# Patient Record
Sex: FEMALE | Race: BLACK OR AFRICAN AMERICAN | NOT HISPANIC OR LATINO | Employment: UNEMPLOYED | URBAN - METROPOLITAN AREA
[De-identification: names, ages, dates, MRNs, and addresses within clinical notes are randomized per-mention and may not be internally consistent; named-entity substitution may affect disease eponyms.]

---

## 2019-09-17 ENCOUNTER — RECORDS - HEALTHEAST (OUTPATIENT)
Dept: LAB | Facility: CLINIC | Age: 24
End: 2019-09-17

## 2019-09-18 LAB
GAMMA INTERFERON BACKGROUND BLD IA-ACNC: 0.04 IU/ML
M TB IFN-G BLD-IMP: NEGATIVE
MITOGEN IGNF BCKGRD COR BLD-ACNC: -0.01 IU/ML
MITOGEN IGNF BCKGRD COR BLD-ACNC: -0.01 IU/ML
QTF INTERPRETATION: NORMAL
QTF MITOGEN - NIL: >10 IU/ML

## 2022-03-15 ENCOUNTER — ANCILLARY PROCEDURE (OUTPATIENT)
Dept: ULTRASOUND IMAGING | Facility: CLINIC | Age: 27
End: 2022-03-15
Attending: EMERGENCY MEDICINE
Payer: COMMERCIAL

## 2022-03-15 ENCOUNTER — HOSPITAL ENCOUNTER (EMERGENCY)
Facility: CLINIC | Age: 27
Discharge: HOME | End: 2022-03-15
Attending: EMERGENCY MEDICINE | Admitting: EMERGENCY MEDICINE
Payer: COMMERCIAL

## 2022-03-15 VITALS
OXYGEN SATURATION: 98 % | RESPIRATION RATE: 16 BRPM | WEIGHT: 126 LBS | TEMPERATURE: 98.4 F | DIASTOLIC BLOOD PRESSURE: 75 MMHG | SYSTOLIC BLOOD PRESSURE: 114 MMHG | HEART RATE: 88 BPM

## 2022-03-15 DIAGNOSIS — L02.419 CELLULITIS AND ABSCESS OF LEG: ICD-10-CM

## 2022-03-15 DIAGNOSIS — L03.119 CELLULITIS AND ABSCESS OF LEG: ICD-10-CM

## 2022-03-15 PROCEDURE — 76882 US LMTD JT/FCL EVL NVASC XTR: CPT | Mod: LT

## 2022-03-15 PROCEDURE — 99284 EMERGENCY DEPT VISIT MOD MDM: CPT | Mod: 25

## 2022-03-16 NOTE — ED PROVIDER NOTES
EMERGENCY DEPARTMENT ENCOUNTER      NAME: Adelina Mcpherson  AGE: 26 year old female  YOB: 1995  MRN: [id number]  EVALUATION DATE & TIME: 3/15/2022  7:39 PM    PCP: Georgette Hastings    ED PROVIDER: Rudy James M.D.      Chief Complaint   Patient presents with     Cyst     groin         FINAL IMPRESSION:  Abscess left proximal medial thigh      ED COURSE & MEDICAL DECISION MAKING:    Pertinent Labs & Imaging studies reviewed. (See chart for details)  26 year old female presents to the Emergency Department for evaluation of current abscess in left proximal thigh.  Patient initially had this back in November.  It was drained at that time and she was placed on antibiotics.  She had a recurrence in February which spontaneously drained.  Patient reports sudden development of infection today after going to bed last night not being normal.  Examination reveals an area of erythema with multiple whitish foci consistent with developing abscess.  Bedside ultrasound immediately performed.  Approximately 2 x 1 cm x 1 cm area of fluid noted.  Patient very reluctant to proceed with drainage as she had difficulties previously.  Did discuss the possibility of antibiotics and heat packs.  However patient wished to proceed with incision and drainage.  Recommendations were to ice the area prior to injecting the area and proceeding with drainage.. Patient appears non toxic with stable vitals signs.     8:12 PM I met with the patient for the initial interview and physical examination. Discussed plan for treatment and workup in the ED.     9:12 PM Patient decided to leave because her anxiety is too severe.  Did offer antibiotics for patient declined.  She reports she will try to return later when her anxiety is improved    At the conclusion of the encounter I discussed the results of all of the tests and the disposition. The questions were answered and return precautions provided. The patient or family acknowledged  understanding and was agreeable with the care plan.       PPE: Provider wore gloves, N95 mask, eye protection, and surgical cap.     MEDICATIONS GIVEN IN THE EMERGENCY:  Medications - No data to display    NEW PRESCRIPTIONS STARTED AT TODAY'S ER VISIT  New Prescriptions    No medications on file          =================================================================    HPI    Patient information was obtained from: Patient    Use of Intrepreter: N/A       Hibaq SULTANA Mcpherson is a 26 year old female with a pertient medical history of recurring cyst who presents to the ED for evaluation of cyst.     Patient complains of a returning inflamed and painful cyst on her left groin area. Patient had the cyst drained on 11/24/21 and was given a course of antibiotics. Patient notes that the cyst was healed after her course of antibiotics. Patient says the second week of February she says the cyst returned and became pus filled. Patient says she took a shower and then the cyst drained on its own and she applied neosporin to it afterwards. Patient notes that there was no pain when the cyst returned in February. Patient reports that the cyst was not swollen last night but when she woke up today it was swollen and painful. Patient currently rates the pain an 8/10. Patient denies fever, chills, taking daily medications, or any other concerns at this time.       REVIEW OF SYSTEMS   Constitutional:  Denies fever, chills  Respiratory:  Denies productive cough or increased work of breathing  Cardiovascular:  Denies chest pain, palpitations  GI:  Denies abdominal pain, nausea, vomiting, or change in bowel or bladder habits   Musculoskeletal:  Denies any new muscle/joint swelling  Skin:  Denies rash. Endorses cyst in left groin area.  Neurologic:  Denies focal weakness  All systems negative except as marked.     PAST MEDICAL HISTORY:  History reviewed. No pertinent past medical history.    PAST SURGICAL HISTORY:  History reviewed. No  pertinent surgical history.      CURRENT MEDICATIONS:    No current facility-administered medications for this encounter.  No current outpatient medications on file.    ALLERGIES:  No Known Allergies    FAMILY HISTORY:  History reviewed. No pertinent family history.    SOCIAL HISTORY:   Social History     Socioeconomic History     Marital status: Single     Spouse name: None     Number of children: None     Years of education: None     Highest education level: None   Occupational History     None   Tobacco Use     Smoking status: None     Smokeless tobacco: None   Substance and Sexual Activity     Alcohol use: None     Drug use: None     Sexual activity: None   Other Topics Concern     None   Social History Narrative     None     Social Determinants of Health     Financial Resource Strain: Not on file   Food Insecurity: Not on file   Transportation Needs: Not on file   Physical Activity: Not on file   Stress: Not on file   Social Connections: Not on file   Intimate Partner Violence: Not on file   Housing Stability: Not on file       VITALS:  Patient Vitals for the past 24 hrs:   BP Temp Temp src Pulse Resp SpO2 Weight   03/15/22 1646 114/75 98.4  F (36.9  C) Oral 88 16 98 % 57.2 kg (126 lb)        PHYSICAL EXAM   Constitutional:  Awake, alert, in mild apparent distress  HENT:  Normocephalic, Atraumatic. Bilateral external ears normal. Oropharynx moist. Nose normal. Neck- Normal range of motion with no guarding, No midline cervical tenderness, Supple, No stridor.   Eyes:  PERRL, EOMI with no signs of entrapment, Conjunctiva normal, No discharge.   Musculoskeletal:  Intact distal pulses, No edema. Good range of motion in all major joints. No tenderness to palpation or major deformities noted.  Integument:  Warm, Dry.  1 x 3 cm area of erythema and tenderness with multiple whitish papules along the proximal medial thigh.  Neurologic:  Alert & oriented, Normal motor function, Normal sensory function, No focal deficits  noted.   Psychiatric:  Affect normal, Judgment normal, Mood normal.     Bedside ultrasound  Indications: Marked soft tissue swelling and discomfort along medial left thigh  Findings: Fluid-filled pocket approximately 3 x 1 x 1 cm        I, Francisco Mendoza, am serving as a scribe to document services personally performed by Rudy James MD, based on my observation and the provider's statements to me. I, Rudy James MD attest that Francisco Mendoza is acting in a scribe capacity, has observed my performance of the services and has documented them in accordance with my direction.    Rudy James M.D.  Emergency Medicine  [facility] EMERGENCY ROOM      Rudy James MD  03/15/22 6838

## 2022-03-16 NOTE — ED NOTES
Pt left because she was too anxious for the procedure. MD spoke with patient and attempted to give her an antibiotic but she wanted to leave. The pt didn't want to wait and said that she would be coming back. Pt left prior to discharge.

## 2022-11-23 ENCOUNTER — APPOINTMENT (OUTPATIENT)
Dept: CT IMAGING | Facility: CLINIC | Age: 27
End: 2022-11-23
Attending: EMERGENCY MEDICINE
Payer: COMMERCIAL

## 2022-11-23 ENCOUNTER — NURSE TRIAGE (OUTPATIENT)
Dept: NURSING | Facility: CLINIC | Age: 27
End: 2022-11-23

## 2022-11-23 ENCOUNTER — HOSPITAL ENCOUNTER (EMERGENCY)
Facility: CLINIC | Age: 27
Discharge: HOME | End: 2022-11-23
Attending: EMERGENCY MEDICINE | Admitting: EMERGENCY MEDICINE
Payer: COMMERCIAL

## 2022-11-23 VITALS
WEIGHT: 125 LBS | BODY MASS INDEX: 20.83 KG/M2 | HEART RATE: 85 BPM | RESPIRATION RATE: 14 BRPM | SYSTOLIC BLOOD PRESSURE: 118 MMHG | DIASTOLIC BLOOD PRESSURE: 63 MMHG | HEIGHT: 65 IN | TEMPERATURE: 98.3 F | OXYGEN SATURATION: 100 %

## 2022-11-23 DIAGNOSIS — D64.9 CHRONIC ANEMIA: ICD-10-CM

## 2022-11-23 DIAGNOSIS — E87.6 HYPOKALEMIA: ICD-10-CM

## 2022-11-23 DIAGNOSIS — K92.0 HEMATEMESIS WITH NAUSEA: ICD-10-CM

## 2022-11-23 DIAGNOSIS — R11.2 NAUSEA AND VOMITING, UNSPECIFIED VOMITING TYPE: ICD-10-CM

## 2022-11-23 LAB
ALBUMIN SERPL-MCNC: 4.4 G/DL (ref 3.5–5)
ALBUMIN UR-MCNC: 70 MG/DL
ALP SERPL-CCNC: 62 U/L (ref 45–120)
ALT SERPL W P-5'-P-CCNC: 9 U/L (ref 0–45)
AMPHETAMINES UR QL SCN: NORMAL
ANION GAP SERPL CALCULATED.3IONS-SCNC: 13 MMOL/L (ref 5–18)
APPEARANCE UR: CLEAR
AST SERPL W P-5'-P-CCNC: 24 U/L (ref 0–40)
BARBITURATES UR QL: NORMAL
BASOPHILS # BLD AUTO: 0 10E3/UL (ref 0–0.2)
BASOPHILS NFR BLD AUTO: 1 %
BENZODIAZ UR QL: NORMAL
BILIRUB DIRECT SERPL-MCNC: 0.2 MG/DL
BILIRUB SERPL-MCNC: 0.4 MG/DL (ref 0–1)
BILIRUB UR QL STRIP: NEGATIVE
BUN SERPL-MCNC: 6 MG/DL (ref 8–22)
CALCIUM SERPL-MCNC: 8.9 MG/DL (ref 8.5–10.5)
CANNABINOIDS UR QL SCN: NORMAL
CHLORIDE BLD-SCNC: 104 MMOL/L (ref 98–107)
CO2 SERPL-SCNC: 20 MMOL/L (ref 22–31)
COCAINE UR QL: NORMAL
COLOR UR AUTO: YELLOW
CREAT SERPL-MCNC: 0.72 MG/DL (ref 0.6–1.1)
CREAT UR-MCNC: 332 MG/DL
EOSINOPHIL # BLD AUTO: 0 10E3/UL (ref 0–0.7)
EOSINOPHIL NFR BLD AUTO: 0 %
ERYTHROCYTE [DISTWIDTH] IN BLOOD BY AUTOMATED COUNT: 21.6 % (ref 10–15)
FLUAV RNA SPEC QL NAA+PROBE: NEGATIVE
FLUBV RNA RESP QL NAA+PROBE: NEGATIVE
GFR SERPL CREATININE-BSD FRML MDRD: >90 ML/MIN/1.73M2
GLUCOSE BLD-MCNC: 133 MG/DL (ref 70–125)
GLUCOSE UR STRIP-MCNC: NEGATIVE MG/DL
HCG SERPL QL: NEGATIVE
HCT VFR BLD AUTO: 34.7 % (ref 35–47)
HGB BLD-MCNC: 9.8 G/DL (ref 11.7–15.7)
HGB UR QL STRIP: NEGATIVE
HOLD SPECIMEN: NORMAL
IMM GRANULOCYTES # BLD: 0 10E3/UL
IMM GRANULOCYTES NFR BLD: 0 %
KETONES UR STRIP-MCNC: >150 MG/DL
LEUKOCYTE ESTERASE UR QL STRIP: ABNORMAL
LIPASE SERPL-CCNC: 16 U/L (ref 0–52)
LYMPHOCYTES # BLD AUTO: 0.8 10E3/UL (ref 0.8–5.3)
LYMPHOCYTES NFR BLD AUTO: 20 %
MAGNESIUM SERPL-MCNC: 2 MG/DL (ref 1.8–2.6)
MCH RBC QN AUTO: 18.5 PG (ref 26.5–33)
MCHC RBC AUTO-ENTMCNC: 28.2 G/DL (ref 31.5–36.5)
MCV RBC AUTO: 65 FL (ref 78–100)
MONOCYTES # BLD AUTO: 0.2 10E3/UL (ref 0–1.3)
MONOCYTES NFR BLD AUTO: 4 %
MUCOUS THREADS #/AREA URNS LPF: PRESENT /LPF
NEUTROPHILS # BLD AUTO: 2.7 10E3/UL (ref 1.6–8.3)
NEUTROPHILS NFR BLD AUTO: 75 %
NITRATE UR QL: NEGATIVE
NRBC # BLD AUTO: 0 10E3/UL
NRBC BLD AUTO-RTO: 0 /100
OPIATES UR QL SCN: NORMAL
OXYCODONE UR QL: NORMAL
PCP UR QL SCN: NORMAL
PH UR STRIP: 6 [PH] (ref 5–7)
PLATELET # BLD AUTO: 378 10E3/UL (ref 150–450)
POTASSIUM BLD-SCNC: 3.1 MMOL/L (ref 3.5–5)
PROT SERPL-MCNC: 8.7 G/DL (ref 6–8)
RBC # BLD AUTO: 5.31 10E6/UL (ref 3.8–5.2)
RBC URINE: 2 /HPF
RSV RNA SPEC NAA+PROBE: NEGATIVE
SARS-COV-2 RNA RESP QL NAA+PROBE: NEGATIVE
SODIUM SERPL-SCNC: 137 MMOL/L (ref 136–145)
SP GR UR STRIP: 1.04 (ref 1–1.03)
SQUAMOUS EPITHELIAL: 2 /HPF
UROBILINOGEN UR STRIP-MCNC: 2 MG/DL
WBC # BLD AUTO: 3.7 10E3/UL (ref 4–11)
WBC URINE: 5 /HPF

## 2022-11-23 PROCEDURE — 80053 COMPREHEN METABOLIC PANEL: CPT | Performed by: EMERGENCY MEDICINE

## 2022-11-23 PROCEDURE — 80307 DRUG TEST PRSMV CHEM ANLYZR: CPT | Performed by: EMERGENCY MEDICINE

## 2022-11-23 PROCEDURE — 250N000011 HC RX IP 250 OP 636: Performed by: EMERGENCY MEDICINE

## 2022-11-23 PROCEDURE — 96365 THER/PROPH/DIAG IV INF INIT: CPT

## 2022-11-23 PROCEDURE — 85025 COMPLETE CBC W/AUTO DIFF WBC: CPT | Performed by: EMERGENCY MEDICINE

## 2022-11-23 PROCEDURE — 74177 CT ABD & PELVIS W/CONTRAST: CPT

## 2022-11-23 PROCEDURE — 82248 BILIRUBIN DIRECT: CPT | Performed by: EMERGENCY MEDICINE

## 2022-11-23 PROCEDURE — 81001 URINALYSIS AUTO W/SCOPE: CPT | Performed by: EMERGENCY MEDICINE

## 2022-11-23 PROCEDURE — 83735 ASSAY OF MAGNESIUM: CPT | Performed by: EMERGENCY MEDICINE

## 2022-11-23 PROCEDURE — 96375 TX/PRO/DX INJ NEW DRUG ADDON: CPT | Mod: 59

## 2022-11-23 PROCEDURE — 258N000003 HC RX IP 258 OP 636: Performed by: EMERGENCY MEDICINE

## 2022-11-23 PROCEDURE — 83690 ASSAY OF LIPASE: CPT | Performed by: EMERGENCY MEDICINE

## 2022-11-23 PROCEDURE — C9113 INJ PANTOPRAZOLE SODIUM, VIA: HCPCS | Performed by: EMERGENCY MEDICINE

## 2022-11-23 PROCEDURE — 36415 COLL VENOUS BLD VENIPUNCTURE: CPT | Performed by: EMERGENCY MEDICINE

## 2022-11-23 PROCEDURE — 87637 SARSCOV2&INF A&B&RSV AMP PRB: CPT | Performed by: EMERGENCY MEDICINE

## 2022-11-23 PROCEDURE — 84703 CHORIONIC GONADOTROPIN ASSAY: CPT | Performed by: EMERGENCY MEDICINE

## 2022-11-23 PROCEDURE — 96376 TX/PRO/DX INJ SAME DRUG ADON: CPT | Mod: 59

## 2022-11-23 PROCEDURE — 96366 THER/PROPH/DIAG IV INF ADDON: CPT

## 2022-11-23 PROCEDURE — 99285 EMERGENCY DEPT VISIT HI MDM: CPT | Mod: 25

## 2022-11-23 PROCEDURE — C9803 HOPD COVID-19 SPEC COLLECT: HCPCS

## 2022-11-23 RX ORDER — IOPAMIDOL 755 MG/ML
80 INJECTION, SOLUTION INTRAVASCULAR ONCE
Status: COMPLETED | OUTPATIENT
Start: 2022-11-23 | End: 2022-11-23

## 2022-11-23 RX ORDER — POTASSIUM CHLORIDE 7.45 MG/ML
10 INJECTION INTRAVENOUS ONCE
Status: COMPLETED | OUTPATIENT
Start: 2022-11-23 | End: 2022-11-23

## 2022-11-23 RX ORDER — ONDANSETRON 2 MG/ML
4 INJECTION INTRAMUSCULAR; INTRAVENOUS ONCE
Status: COMPLETED | OUTPATIENT
Start: 2022-11-23 | End: 2022-11-23

## 2022-11-23 RX ORDER — ONDANSETRON 4 MG/1
4 TABLET, ORALLY DISINTEGRATING ORAL EVERY 8 HOURS PRN
Qty: 10 TABLET | Refills: 0 | Status: SHIPPED | OUTPATIENT
Start: 2022-11-23

## 2022-11-23 RX ADMIN — SODIUM CHLORIDE 1000 ML: 9 INJECTION, SOLUTION INTRAVENOUS at 06:03

## 2022-11-23 RX ADMIN — PANTOPRAZOLE SODIUM 40 MG: 40 INJECTION, POWDER, FOR SOLUTION INTRAVENOUS at 04:12

## 2022-11-23 RX ADMIN — ONDANSETRON 4 MG: 2 INJECTION INTRAMUSCULAR; INTRAVENOUS at 07:04

## 2022-11-23 RX ADMIN — SODIUM CHLORIDE 1000 ML: 9 INJECTION, SOLUTION INTRAVENOUS at 04:07

## 2022-11-23 RX ADMIN — POTASSIUM CHLORIDE 10 MEQ: 7.46 INJECTION, SOLUTION INTRAVENOUS at 04:15

## 2022-11-23 RX ADMIN — ONDANSETRON 4 MG: 2 INJECTION INTRAMUSCULAR; INTRAVENOUS at 04:09

## 2022-11-23 RX ADMIN — IOPAMIDOL 80 ML: 755 INJECTION, SOLUTION INTRAVENOUS at 04:58

## 2022-11-23 ASSESSMENT — ENCOUNTER SYMPTOMS
FEVER: 1
ABDOMINAL PAIN: 1
COUGH: 0
SHORTNESS OF BREATH: 0
SEIZURES: 0
DYSURIA: 0
DIARRHEA: 0
VOMITING: 1
NAUSEA: 1

## 2022-11-23 ASSESSMENT — ACTIVITIES OF DAILY LIVING (ADL)
ADLS_ACUITY_SCORE: 35
ADLS_ACUITY_SCORE: 35

## 2022-11-23 NOTE — ED NOTES
"EMERGENCY DEPARTMENT SIGN OUT NOTE        ED COURSE AND MEDICAL DECISION MAKING  Patient was signed out to me by Dr Georgette Alvarado at 4:55 AM   6:00 AM I met with the patient and discussed plan for discharge.  Reevaluation reveals that her symptoms are improved in the emergency department.    In brief, Adelina Mcpherson is a 27 year old female who initially presented with vomiting     \"For nearly 24 hours now she has been having vomiting.  Multiple episodes and her last episode was now possibly containing blood.  She complains of upper abdominal and chest discomfort.  She also complains of subjective fevers.  Otherwise denies any cough, shortness of breath or diarrhea.  She has not had her flu shot this year.\"     At time of sign out, disposition was pending normal CT results and recheck     FINAL IMPRESSION    1. Nausea and vomiting, unspecified vomiting type    2. Hematemesis with nausea    3. Chronic anemia    4. Hypokalemia        ED MEDS  Medications   ondansetron (ZOFRAN) injection 4 mg (4 mg Intravenous Given 11/23/22 0409)   0.9% sodium chloride BOLUS (0 mLs Intravenous Stopped 11/23/22 0609)   pantoprazole (PROTONIX) IV push injection 40 mg (40 mg Intravenous Given 11/23/22 0412)   potassium chloride 10 mEq in 100 mL sterile water infusion (0 mEq Intravenous Stopped 11/23/22 0610)   0.9% sodium chloride BOLUS (1,000 mLs Intravenous New Bag 11/23/22 0603)   iopamidol (ISOVUE-370) solution 80 mL (80 mLs Intravenous Given 11/23/22 0458)       LAB  Labs Ordered and Resulted from Time of ED Arrival to Time of ED Departure   BASIC METABOLIC PANEL - Abnormal       Result Value    Sodium 137      Potassium 3.1 (*)     Chloride 104      Carbon Dioxide (CO2) 20 (*)     Anion Gap 13      Urea Nitrogen 6 (*)     Creatinine 0.72      Calcium 8.9      Glucose 133 (*)     GFR Estimate >90     HEPATIC FUNCTION PANEL - Abnormal    Bilirubin Total 0.4      Bilirubin Direct 0.2      Protein Total 8.7 (*)     Albumin 4.4      " Alkaline Phosphatase 62      AST 24      ALT 9     ROUTINE UA WITH MICROSCOPIC REFLEX TO CULTURE - Abnormal    Color Urine Yellow      Appearance Urine Clear      Glucose Urine Negative      Bilirubin Urine Negative      Ketones Urine >150 (*)     Specific Gravity Urine 1.042 (*)     Blood Urine Negative      pH Urine 6.0      Protein Albumin Urine 70 (*)     Urobilinogen Urine 2.0 (*)     Nitrite Urine Negative      Leukocyte Esterase Urine 25 Viola/uL (*)     Mucus Urine Present (*)     RBC Urine 2      WBC Urine 5      Squamous Epithelials Urine 2 (*)    CBC WITH PLATELETS AND DIFFERENTIAL - Abnormal    WBC Count 3.7 (*)     RBC Count 5.31 (*)     Hemoglobin 9.8 (*)     Hematocrit 34.7 (*)     MCV 65 (*)     MCH 18.5 (*)     MCHC 28.2 (*)     RDW 21.6 (*)     Platelet Count 378      % Neutrophils 75      % Lymphocytes 20      % Monocytes 4      % Eosinophils 0      % Basophils 1      % Immature Granulocytes 0      NRBCs per 100 WBC 0      Absolute Neutrophils 2.7      Absolute Lymphocytes 0.8      Absolute Monocytes 0.2      Absolute Eosinophils 0.0      Absolute Basophils 0.0      Absolute Immature Granulocytes 0.0      Absolute NRBCs 0.0     LIPASE - Normal    Lipase 16     HCG QUALITATIVE PREGNANCY - Normal    hCG Serum Qualitative Negative     INFLUENZA A/B & SARS-COV2 PCR MULTIPLEX - Normal    Influenza A PCR Negative      Influenza B PCR Negative      RSV PCR Negative      SARS CoV2 PCR Negative     MAGNESIUM - Normal    Magnesium 2.0     DRUGS OF ABUSE 1 PANEL, URINE ([facility] ONLY)    Amphetamines Urine Screen Negative      Benzodiazepines Urine Screen Negative      Opiates Urine Screen Negative      PCP Urine Screen Negative      Cannabinoids Urine Screen Negative      Barbiturates Urine Screen Negative      Cocaine Urine Screen Negative      Oxycodone Urine Screen Negative      Creatinine Urine mg/dL 332           RADIOLOGY    CT Chest/Abdomen/Pelvis w Contrast   Final Result   IMPRESSION:   1.  Negative  CT of the chest, abdomen, and pelvis. No acute abnormalities or CT findings to explain the patient's symptoms.             DISCHARGE MEDS  New Prescriptions    OMEPRAZOLE (PRILOSEC) 20 MG DR CAPSULE    Take 1 capsule (20 mg) by mouth daily for 14 days    ONDANSETRON (ZOFRAN ODT) 4 MG ODT TAB    Take 1 tablet (4 mg) by mouth every 8 hours as needed for nausea or vomiting       Li Elkins MD  [facility] EMERGENCY ROOM  [address] [place] [id number] [phone]     Li Elkins MD  11/23/22 0610       Li Elkins MD  11/23/22 0612

## 2022-11-23 NOTE — ED PROVIDER NOTES
EMERGENCY DEPARTMENT ENCOUNTER      NAME: Adelina Mcpherson  AGE: 27 year old female  YOB: 1995  MRN: [id number]  EVALUATION DATE & TIME: No admission date for patient encounter.    PCP: Georgette Hastings    ED PROVIDER: Georgette Alvarado M.D.        Chief Complaint   Patient presents with     Vomiting         FINAL IMPRESSION:    1. Nausea and vomiting, unspecified vomiting type    2. Hematemesis with nausea    3. Chronic anemia    4. Hypokalemia            MEDICAL DECISION MAKIN year old female with history of seizures and presents emergency department with vomiting.  She also had an episode of hematemesis this evening which is what prompted her to present to the emergency department.  Laboratories overall unremarkable except for mildly low potassium.  Hemoglobin is low at 9.8, but she has been chronically around 8.9-10.4 for the last 1 year.  Influenza, COVID, RSV negative.  THC screen negative.  Patient signed out at change of shift awaiting CT scan results and symptomatic support.    Patient states she does have a history of seizures and has been noncompliant with her medication now for a few weeks.  Patient has been encouraged to restart her seizure medicines.      ED COURSE:  3:11 AM  I met with the patient to gather history and perform my exam. ED course and treatment discussed.    3:47 AM  Patient has a hemoglobin of 9.8 today, but chart review shows that she had a hemoglobin of 10.4 about 1 month ago and a hemoglobin of 8.9 about 1 year ago and another nearly 2 years ago.  This anemia appears to be chronic and overall stable.    5:03 AM  Patient signed out at change of shift awaiting CT scan results and disposition.  If CT scan looks good I feel it is appropriate to discharge her home with prescription for ondansetron and omeprazole.  Likely her reported hematemesis is from irritation of the stomach or even esophagus from her vomiting such as a small Macarena-Topete tear.  Nothing on  history or exam to suggest that she has esophageal varices or active bleeding gastric ulcer.  Influenza negative.  Pregnancy negative.  Urine drug screen also negative for things like marijuana that sometimes can produce vomiting.  She is otherwise hemodynamically stable.    I do not think that this represents ACS, PE, ruptured AAA, aortic dissection, bowel obstruction, bowel ischemia, cholecystitis, pancreatitis, appendicitis, diverticulitis, kidney stone, pyelonephritis, incarcerated or strangulated hernia, ovarian torsion, PID, ectopic pregnancy, tubo-ovarian abscess, viscus perforation, perforated GI ulcer, or other such etiologies at this time.      COVID-19 PPE worn during patient evaluation:  Mask: n95 and homemade masks   Eye Protection: goggles   Gown: none   Hair cover: yes  Face shield: none   Patient wearing a mask: yes       CONSULTANTS:  none        MEDICATIONS GIVEN IN THE EMERGENCY:  Medications   potassium chloride 10 mEq in 100 mL sterile water infusion (10 mEq Intravenous New Bag 11/23/22 0415)   0.9% sodium chloride BOLUS (has no administration in time range)   ondansetron (ZOFRAN) injection 4 mg (4 mg Intravenous Given 11/23/22 0409)   0.9% sodium chloride BOLUS (1,000 mLs Intravenous New Bag 11/23/22 0407)   pantoprazole (PROTONIX) IV push injection 40 mg (40 mg Intravenous Given 11/23/22 0412)   iopamidol (ISOVUE-370) solution 80 mL (80 mLs Intravenous Given 11/23/22 0458)           NEW PRESCRIPTIONS STARTED AT TODAY'S ER VISIT     Medication List      Started    omeprazole 20 MG DR capsule  Commonly known as: priLOSEC  20 mg, Oral, DAILY     ondansetron 4 MG ODT tab  Commonly known as: ZOFRAN ODT  4 mg, Oral, EVERY 8 HOURS PRN                CONDITION:  stable        DISPOSITION:  pending         =================================================================  =================================================================  TRIAGE ASSESSMENT:  Pt reports vomiting since the morning of  "11/22. She has not been able to keep anything down and has been vomiting so much she feels likes her stomach is cramping constantly. She also noticed dark red in her last emesis PTA. Attempted to take zofran at home without success.        ED Triage Vitals [11/23/22 0249]   Enc Vitals Group      /79      Pulse 83      Resp 18      Temp 98.3  F (36.8  C)      Temp src Temporal      SpO2 100 %      Weight 56.7 kg (125 lb)      Height 1.651 m (5' 5\")         ================================================================  ================================================================    HPI    Patient information was obtained from: patient    Use of Intrepreter: N/A     Adelina SULTANA Mcpherson is a 27 year old female with history of seizures who presents to the ER with complaints of vomiting.  For nearly 24 hours now she has been having vomiting.  Multiple episodes and her last episode was now possibly containing blood.  She complains of upper abdominal and chest discomfort.  She also complains of subjective fevers.  Otherwise denies any cough, shortness of breath or diarrhea.  She has not had her flu shot this year.    She did try some Zofran at home but was unable to even keep that down.  She denies any alcohol, drug, or marijuana use.    Chart review shows that she was seen in the emergency department on October 26, 2022 and June 6, 2022 for GI symptoms including vomiting.    She has also been noncompliant with her seizure meds x few weeks now.      REVIEW OF SYSTEMS  Review of Systems   Constitutional: Positive for fever (subjective).   Respiratory: Negative for cough and shortness of breath.    Cardiovascular: Positive for chest pain (after vomiting multiple times).   Gastrointestinal: Positive for abdominal pain, nausea and vomiting. Negative for diarrhea.   Genitourinary: Negative for dysuria.   Allergic/Immunologic: Negative for immunocompromised state.   Neurological: Negative for seizures.   All other " "systems reviewed and are negative.      PAST MEDICAL HISTORY:  Past Medical History:   Diagnosis Date     Seizures (H)          PAST SURGICAL HISTORY:  History reviewed. No pertinent surgical history.      CURRENT MEDICATIONS:    Prior to Admission medications    Not on File         ALLERGIES:  No Known Allergies      FAMILY HISTORY:  History reviewed. No pertinent family history.      SOCIAL HISTORY:  Social History     Socioeconomic History     Marital status: Single     Spouse name: None     Number of children: None     Years of education: None     Highest education level: None   Tobacco Use     Smoking status: Never     Smokeless tobacco: Never         VITALS:  Patient Vitals for the past 24 hrs:   BP Temp Temp src Pulse Resp SpO2 Height Weight   11/23/22 0249 111/79 98.3  F (36.8  C) Temporal 83 18 100 % 1.651 m (5' 5\") 56.7 kg (125 lb)       Wt Readings from Last 3 Encounters:   11/23/22 56.7 kg (125 lb)   03/15/22 57.2 kg (126 lb)       Estimated Creatinine Clearance: 106.9 mL/min (based on SCr of 0.72 mg/dL).    PHYSICAL EXAM    Constitutional:  Well developed, Well nourished, NAD, GCS 15  HENT:  Normocephalic, Atraumatic, Bilateral external ears normal, Nose normal. Neck- Supple, No stridor.   Eyes:  PERRL, EOMI, Conjunctiva normal, No discharge.  Respiratory:  Normal breath sounds, No respiratory distress, No wheezing, Speaks full sentences easily. No cough.   Cardiovascular:  Normal heart rate, Regular rhythm, No rubs, No gallops. Chest wall nontender.   GI:  No excessive obesity.  Bowel sounds normal, Soft, No tenderness, No masses, No flank tenderness. No rebound or guarding.   : deferred  Musculoskeletal:  No cyanosis, No clubbing. Good range of motion in all major joints. No major deformities noted.  Integument:  Warm, Dry, No erythema, No rash.  No petechiae.  Neurologic:  Alert & oriented x 3, Normal gait.   Psychiatric:  Affect normal, Cooperative         LAB:  All pertinent labs reviewed and " interpreted.  Recent Results (from the past 24 hour(s))   HCG QUALitative pregnancy (blood)    Collection Time: 11/23/22  3:26 AM   Result Value Ref Range    hCG Serum Qualitative Negative Negative   CBC with platelets and differential    Collection Time: 11/23/22  3:26 AM   Result Value Ref Range    WBC Count 3.7 (L) 4.0 - 11.0 10e3/uL    RBC Count 5.31 (H) 3.80 - 5.20 10e6/uL    Hemoglobin 9.8 (L) 11.7 - 15.7 g/dL    Hematocrit 34.7 (L) 35.0 - 47.0 %    MCV 65 (L) 78 - 100 fL    MCH 18.5 (L) 26.5 - 33.0 pg    MCHC 28.2 (L) 31.5 - 36.5 g/dL    RDW 21.6 (H) 10.0 - 15.0 %    Platelet Count 378 150 - 450 10e3/uL    % Neutrophils 75 %    % Lymphocytes 20 %    % Monocytes 4 %    % Eosinophils 0 %    % Basophils 1 %    % Immature Granulocytes 0 %    NRBCs per 100 WBC 0 <1 /100    Absolute Neutrophils 2.7 1.6 - 8.3 10e3/uL    Absolute Lymphocytes 0.8 0.8 - 5.3 10e3/uL    Absolute Monocytes 0.2 0.0 - 1.3 10e3/uL    Absolute Eosinophils 0.0 0.0 - 0.7 10e3/uL    Absolute Basophils 0.0 0.0 - 0.2 10e3/uL    Absolute Immature Granulocytes 0.0 <=0.4 10e3/uL    Absolute NRBCs 0.0 10e3/uL       No results found for: ABORH        RADIOLOGY:  Reviewed all pertinent imaging. Please see official radiology report.    CT Chest/Abdomen/Pelvis w Contrast    (Results Pending)         EKG:    none    PROCEDURES:  none      Georgette Alvarado M.D. [facility]  Emergency Medicine and Medical Toxicology  [facility] EMERGENCY ROOM  [address] [facility] [id number] [phone]  Dept: [phone]           Georgette Alvarado MD  11/23/22 0528

## 2022-11-23 NOTE — TELEPHONE ENCOUNTER
Pt is calling.    Was just discharged from the ED with vomiting and diarrhea, vomiting blood.  Was feeling better when she left due to fluids, IV Zofran. Potassium was replaced.  Now she is back to where she was when she was first seen in the ED with nausea, vomiting, diarrhea. No further vomiting of blood.  Denies fever.  Well hydrated now. Keeping ice chips down.    Care advice reviewed. When to call back reviewed per care advice. I advised her to reach out to her PCP now, and make sure she talks to a nurse there, and advises them that she was just seen in the ED. Signs of anemia. Close follow up is needed.  She verbalized understanding and agreed to follow care advice given and will contact her PCP now for follow up appointment.        Carolina Gonzales RN  [facility] Nurse Advisor  11/23/2022 at 8:45 AM        Reason for Disposition    Vomiting with diarrhea    Additional Information    Negative: Shock suspected (e.g., cold/pale/clammy skin, too weak to stand, low BP, rapid pulse)    Negative: Difficult to awaken or acting confused (e.g., disoriented, slurred speech)    Negative: Sounds like a life-threatening emergency to the triager    Negative: Vomiting occurs only while coughing    Negative: Pregnant < 20 Weeks and nausea/vomiting began in early pregnancy (i.e., 4-8 weeks pregnant)    Negative: Chest pain    Negative: Headache is main symptom    Negative: Vomiting red blood or black (coffee ground) material    Negative: Vomiting and hernia is more painful or swollen than usual    Negative: Recent head injury (within 3 days)    Negative: Recent abdominal injury (within 7 days)    Negative: Insulin-dependent diabetes and glucose > 240 mg/dL (13 mmol/L)    Negative: Severe pain in one eye    Negative: SEVERE vomiting (e.g., 6 or more times/day)  (Exception: Patient sounds well, is drinking liquids, does not sound dehydrated, and vomiting has lasted less than 24 hours.)    Negative: MODERATE vomiting (e.g., 3  - 5 times/day) and age > 60 years    Negative: Constant abdominal pain lasting > 2 hours    Negative: High-risk adult (e.g., brain tumor, V-P shunt, hernia)    Negative: Drinking very little and has signs of dehydration (e.g., no urine > 12 hours, very dry mouth, very lightheaded)    Negative: Patient sounds very sick or weak to the triager    Negative: Vomiting and abdomen looks much more swollen than usual    Negative: Vomiting contains bile (green color)    Negative: Fever > 103 F (39.4 C)    Negative: Fever > 101 F (38.3 C) and over 60 years of age    Negative: Fever > 100.0 F (37.8 C) and has a weak immune system (e.g., HIV positive, cancer chemo, organ transplant, splenectomy, chronic steroids)    Negative: Fever > 100.0 F (37.8 C) and bedridden (e.g., nursing home patient, stroke, chronic illness, recovering from surgery)    Negative: Taking any of the following medications: digoxin (Lanoxin), lithium, theophylline, phenytoin (Dilantin)    Negative: SEVERE headache and vomiting    Negative: MILD to MODERATE vomiting (e.g., 1-5 times/day) and lasts > 48 hours (2 days)    Negative: Fever present > 3 days (72 hours)    Negative: Patient wants to be seen    Negative: Vomiting a prescription medication    Negative: Substance use (drug use) or unhealthy alcohol use, known or suspected    Negative: Vomiting is a chronic symptom (recurrent or ongoing AND lasting > 4 weeks)    Negative: Vomiting    Protocols used: VOMITING-A-OH

## 2022-11-23 NOTE — DISCHARGE INSTRUCTIONS
Be sure to take your seizure medicines.     You continue to be anemic - your hemoglobin continues to be low (similar to 1 year ago). Continue to work with your primary care provider for this.     Take the ondansetron nausea medication as directed if needed for ongoing nausea and vomiting.  Also take the omeprazole to help decrease the stomach and your acid for a few weeks to allow it to heal.    Make an appointment to follow-up with your family doctor for early next week for reevaluation for the bloody vomiting.    Return emergency department if you develop worsening abdominal pain, worsening vomiting with blood, or any other concerns.    Thank you for choosing [facility] Emergency [facility].  It has been my pleasure caring for you today.     ~Dr. Christiano MD

## 2022-11-23 NOTE — ED TRIAGE NOTES
Pt reports vomiting since the morning of 11/22. She has not been able to keep anything down and has been vomiting so much she feels likes her stomach is cramping constantly. She also noticed dark red in her last emesis PTA. Attempted to take zofran at home without success.     Triage Assessment     Row Name 11/23/22 0250       Triage Assessment (Adult)    Airway WDL WDL       Respiratory WDL    Respiratory WDL WDL       Skin Circulation/Temperature WDL    Skin Circulation/Temperature WDL WDL       Cardiac WDL    Cardiac WDL WDL       Peripheral/Neurovascular WDL    Peripheral Neurovascular WDL WDL       Cognitive/Neuro/Behavioral WDL    Cognitive/Neuro/Behavioral WDL WDL

## 2023-06-19 ENCOUNTER — HOSPITAL ENCOUNTER (EMERGENCY)
Facility: CLINIC | Age: 28
Discharge: HOME | End: 2023-06-19
Attending: EMERGENCY MEDICINE | Admitting: EMERGENCY MEDICINE
Payer: COMMERCIAL

## 2023-06-19 VITALS
RESPIRATION RATE: 16 BRPM | TEMPERATURE: 97.7 F | DIASTOLIC BLOOD PRESSURE: 50 MMHG | BODY MASS INDEX: 23.39 KG/M2 | HEIGHT: 64 IN | WEIGHT: 137 LBS | HEART RATE: 91 BPM | OXYGEN SATURATION: 100 % | SYSTOLIC BLOOD PRESSURE: 102 MMHG

## 2023-06-19 DIAGNOSIS — R51.9 ACUTE NONINTRACTABLE HEADACHE, UNSPECIFIED HEADACHE TYPE: ICD-10-CM

## 2023-06-19 DIAGNOSIS — N39.0 URINARY TRACT INFECTION WITHOUT HEMATURIA, SITE UNSPECIFIED: ICD-10-CM

## 2023-06-19 DIAGNOSIS — R11.2 NAUSEA AND VOMITING, UNSPECIFIED VOMITING TYPE: ICD-10-CM

## 2023-06-19 DIAGNOSIS — R19.7 DIARRHEA, UNSPECIFIED TYPE: ICD-10-CM

## 2023-06-19 LAB
ALBUMIN SERPL-MCNC: 4.4 G/DL (ref 3.5–5)
ALBUMIN UR-MCNC: 10 MG/DL
ALP SERPL-CCNC: 65 U/L (ref 45–120)
ALT SERPL W P-5'-P-CCNC: 17 U/L (ref 0–45)
ANION GAP SERPL CALCULATED.3IONS-SCNC: 13 MMOL/L (ref 5–18)
APPEARANCE UR: CLEAR
AST SERPL W P-5'-P-CCNC: 26 U/L (ref 0–40)
BASOPHILS # BLD AUTO: 0 10E3/UL (ref 0–0.2)
BASOPHILS NFR BLD AUTO: 1 %
BILIRUB SERPL-MCNC: 0.3 MG/DL (ref 0–1)
BILIRUB UR QL STRIP: NEGATIVE
BUN SERPL-MCNC: 4 MG/DL (ref 8–22)
CALCIUM SERPL-MCNC: 9.4 MG/DL (ref 8.5–10.5)
CHLORIDE BLD-SCNC: 105 MMOL/L (ref 98–107)
CO2 SERPL-SCNC: 20 MMOL/L (ref 22–31)
COLOR UR AUTO: ABNORMAL
CREAT SERPL-MCNC: 0.7 MG/DL (ref 0.6–1.1)
EOSINOPHIL # BLD AUTO: 0.1 10E3/UL (ref 0–0.7)
EOSINOPHIL NFR BLD AUTO: 3 %
ERYTHROCYTE [DISTWIDTH] IN BLOOD BY AUTOMATED COUNT: 20.9 % (ref 10–15)
FLUAV RNA SPEC QL NAA+PROBE: NEGATIVE
FLUBV RNA RESP QL NAA+PROBE: NEGATIVE
GFR SERPL CREATININE-BSD FRML MDRD: >90 ML/MIN/1.73M2
GLUCOSE BLD-MCNC: 135 MG/DL (ref 70–125)
GLUCOSE UR STRIP-MCNC: NEGATIVE MG/DL
HCG UR QL: NEGATIVE
HCT VFR BLD AUTO: 35.6 % (ref 35–47)
HGB BLD-MCNC: 10.1 G/DL (ref 11.7–15.7)
HGB UR QL STRIP: NEGATIVE
IMM GRANULOCYTES # BLD: 0 10E3/UL
IMM GRANULOCYTES NFR BLD: 0 %
KETONES UR STRIP-MCNC: 40 MG/DL
LEUKOCYTE ESTERASE UR QL STRIP: ABNORMAL
LIPASE SERPL-CCNC: 20 U/L (ref 0–52)
LYMPHOCYTES # BLD AUTO: 2.2 10E3/UL (ref 0.8–5.3)
LYMPHOCYTES NFR BLD AUTO: 44 %
MCH RBC QN AUTO: 18.4 PG (ref 26.5–33)
MCHC RBC AUTO-ENTMCNC: 28.4 G/DL (ref 31.5–36.5)
MCV RBC AUTO: 65 FL (ref 78–100)
MONOCYTES # BLD AUTO: 0.4 10E3/UL (ref 0–1.3)
MONOCYTES NFR BLD AUTO: 8 %
MUCOUS THREADS #/AREA URNS LPF: PRESENT /LPF
NEUTROPHILS # BLD AUTO: 2.3 10E3/UL (ref 1.6–8.3)
NEUTROPHILS NFR BLD AUTO: 44 %
NITRATE UR QL: NEGATIVE
NRBC # BLD AUTO: 0 10E3/UL
NRBC BLD AUTO-RTO: 0 /100
PH UR STRIP: 5.5 [PH] (ref 5–7)
PLATELET # BLD AUTO: 298 10E3/UL (ref 150–450)
POTASSIUM BLD-SCNC: 3.6 MMOL/L (ref 3.5–5)
PROT SERPL-MCNC: 8.4 G/DL (ref 6–8)
RBC # BLD AUTO: 5.49 10E6/UL (ref 3.8–5.2)
RBC URINE: 1 /HPF
RSV RNA SPEC NAA+PROBE: NEGATIVE
SARS-COV-2 RNA RESP QL NAA+PROBE: NEGATIVE
SODIUM SERPL-SCNC: 138 MMOL/L (ref 136–145)
SP GR UR STRIP: 1.02 (ref 1–1.03)
SQUAMOUS EPITHELIAL: 3 /HPF
UROBILINOGEN UR STRIP-MCNC: <2 MG/DL
WBC # BLD AUTO: 5 10E3/UL (ref 4–11)
WBC URINE: 6 /HPF

## 2023-06-19 PROCEDURE — 87637 SARSCOV2&INF A&B&RSV AMP PRB: CPT | Performed by: EMERGENCY MEDICINE

## 2023-06-19 PROCEDURE — 96361 HYDRATE IV INFUSION ADD-ON: CPT

## 2023-06-19 PROCEDURE — 96374 THER/PROPH/DIAG INJ IV PUSH: CPT

## 2023-06-19 PROCEDURE — 81001 URINALYSIS AUTO W/SCOPE: CPT | Performed by: EMERGENCY MEDICINE

## 2023-06-19 PROCEDURE — 96375 TX/PRO/DX INJ NEW DRUG ADDON: CPT

## 2023-06-19 PROCEDURE — 81025 URINE PREGNANCY TEST: CPT | Performed by: EMERGENCY MEDICINE

## 2023-06-19 PROCEDURE — 85048 AUTOMATED LEUKOCYTE COUNT: CPT | Performed by: EMERGENCY MEDICINE

## 2023-06-19 PROCEDURE — 83690 ASSAY OF LIPASE: CPT | Performed by: EMERGENCY MEDICINE

## 2023-06-19 PROCEDURE — C9113 INJ PANTOPRAZOLE SODIUM, VIA: HCPCS | Performed by: EMERGENCY MEDICINE

## 2023-06-19 PROCEDURE — 80053 COMPREHEN METABOLIC PANEL: CPT | Performed by: EMERGENCY MEDICINE

## 2023-06-19 PROCEDURE — 99284 EMERGENCY DEPT VISIT MOD MDM: CPT | Mod: 25

## 2023-06-19 PROCEDURE — 258N000003 HC RX IP 258 OP 636: Performed by: EMERGENCY MEDICINE

## 2023-06-19 PROCEDURE — 36415 COLL VENOUS BLD VENIPUNCTURE: CPT | Performed by: EMERGENCY MEDICINE

## 2023-06-19 PROCEDURE — 250N000011 HC RX IP 250 OP 636: Performed by: EMERGENCY MEDICINE

## 2023-06-19 PROCEDURE — 250N000013 HC RX MED GY IP 250 OP 250 PS 637: Performed by: EMERGENCY MEDICINE

## 2023-06-19 RX ORDER — NITROFURANTOIN 25; 75 MG/1; MG/1
100 CAPSULE ORAL ONCE
Status: COMPLETED | OUTPATIENT
Start: 2023-06-19 | End: 2023-06-19

## 2023-06-19 RX ORDER — ONDANSETRON 2 MG/ML
4 INJECTION INTRAMUSCULAR; INTRAVENOUS ONCE
Status: COMPLETED | OUTPATIENT
Start: 2023-06-19 | End: 2023-06-19

## 2023-06-19 RX ORDER — DIMENHYDRINATE 50 MG/ML
25 INJECTION, SOLUTION INTRAMUSCULAR; INTRAVENOUS ONCE
Status: COMPLETED | OUTPATIENT
Start: 2023-06-19 | End: 2023-06-19

## 2023-06-19 RX ORDER — DIPHENHYDRAMINE HYDROCHLORIDE 50 MG/ML
25 INJECTION INTRAMUSCULAR; INTRAVENOUS ONCE
Status: COMPLETED | OUTPATIENT
Start: 2023-06-19 | End: 2023-06-19

## 2023-06-19 RX ORDER — NITROFURANTOIN 25; 75 MG/1; MG/1
100 CAPSULE ORAL 2 TIMES DAILY
Qty: 6 CAPSULE | Refills: 0 | Status: SHIPPED | OUTPATIENT
Start: 2023-06-19

## 2023-06-19 RX ORDER — KETOROLAC TROMETHAMINE 30 MG/ML
30 INJECTION, SOLUTION INTRAMUSCULAR; INTRAVENOUS ONCE
Status: COMPLETED | OUTPATIENT
Start: 2023-06-19 | End: 2023-06-19

## 2023-06-19 RX ORDER — ONDANSETRON 8 MG/1
8 TABLET, ORALLY DISINTEGRATING ORAL EVERY 8 HOURS PRN
Qty: 12 TABLET | Refills: 0 | Status: SHIPPED | OUTPATIENT
Start: 2023-06-19

## 2023-06-19 RX ADMIN — PROCHLORPERAZINE EDISYLATE 10 MG: 5 INJECTION INTRAMUSCULAR; INTRAVENOUS at 06:12

## 2023-06-19 RX ADMIN — PANTOPRAZOLE SODIUM 40 MG: 40 INJECTION, POWDER, FOR SOLUTION INTRAVENOUS at 05:08

## 2023-06-19 RX ADMIN — DIMENHYDRINATE 25 MG: 50 INJECTION, SOLUTION INTRAMUSCULAR; INTRAVENOUS at 04:51

## 2023-06-19 RX ADMIN — NITROFURANTOIN (MONOHYDRATE/MACROCRYSTALS) 100 MG: 75; 25 CAPSULE ORAL at 06:48

## 2023-06-19 RX ADMIN — SODIUM CHLORIDE 1000 ML: 9 INJECTION, SOLUTION INTRAVENOUS at 04:48

## 2023-06-19 RX ADMIN — KETOROLAC TROMETHAMINE 30 MG: 30 INJECTION, SOLUTION INTRAMUSCULAR; INTRAVENOUS at 04:51

## 2023-06-19 RX ADMIN — ONDANSETRON 4 MG: 2 INJECTION INTRAMUSCULAR; INTRAVENOUS at 04:51

## 2023-06-19 RX ADMIN — DIPHENHYDRAMINE HYDROCHLORIDE 25 MG: 50 INJECTION INTRAMUSCULAR; INTRAVENOUS at 06:12

## 2023-06-19 ASSESSMENT — ENCOUNTER SYMPTOMS
MYALGIAS: 1
RHINORRHEA: 0
DIARRHEA: 1
NAUSEA: 1
VOMITING: 1
ABDOMINAL PAIN: 1
HEADACHES: 1
SORE THROAT: 0

## 2023-06-19 ASSESSMENT — ACTIVITIES OF DAILY LIVING (ADL)
ADLS_ACUITY_SCORE: 35
ADLS_ACUITY_SCORE: 35

## 2023-06-19 NOTE — ED NOTES
"Pt complaining of \"painful and tingling\" IV site. Site flushed, no phlebitis noted. IVF infusing  "

## 2023-06-19 NOTE — ED NOTES
Pt doesn't have the urge to void at the moment. Recently went to void and had a bowel movement. Will re-attempt shortly.

## 2023-06-19 NOTE — ED TRIAGE NOTES
Pt reports having N/V, diarrhea, chills, and headache for few days. Pt has unable to keep fluids or food down.      Triage Assessment     Row Name 06/19/23 0358       Triage Assessment (Adult)    Airway WDL WDL       Respiratory WDL    Respiratory WDL WDL       Skin Circulation/Temperature WDL    Skin Circulation/Temperature WDL WDL       Cardiac WDL    Cardiac WDL WDL       Peripheral/Neurovascular WDL    Peripheral Neurovascular WDL WDL       Cognitive/Neuro/Behavioral WDL    Cognitive/Neuro/Behavioral WDL WDL

## 2023-06-19 NOTE — ED PROVIDER NOTES
EMERGENCY DEPARTMENT ENCOUNTER      NAME: Adelina Mcpherson  AGE: 27 year old female  YOB: 1995  MRN: [id number]  EVALUATION DATE & TIME: No admission date for patient encounter.    PCP: Georgette Hastings    ED PROVIDER: Toshia Rivero M.D.      Chief Complaint   Patient presents with     Headache     Nausea, Vomiting, & Diarrhea         FINAL IMPRESSION:  1. Urinary tract infection without hematuria, site unspecified    2. Nausea and vomiting, unspecified vomiting type    3. Diarrhea, unspecified type    4. Acute nonintractable headache, unspecified headache type        MEDICAL DECISION MAKING:    Pertinent Labs & Imaging studies reviewed. (See chart for details)  ED Course as of 06/19/23 0637   Mon Jun 19, 2023   0414     Vital signs are unremarkable.  Patient is coming in with epigastric abdominal pain, nausea vomiting and diarrhea.  Ongoing since Thursday.  Today with Predyne headache.  Some muscle aches.  No runny nose or sore throat.  No known sick contacts.  She reports that she has not had any bloody emesis or stools.  Complaining of pain across her entire abdomen but primarily in the epigastric region.  No pain that radiates to her back.  No urinary issues.  Last menstrual period was few days ago.  No chance of pregnancy.    Physical exam for patient here appears mildly uncomfortable.  She has epigastric tenderness to palpation, mild lower abdominal tenderness to palpation.  No rebound or guarding.  No significant right upper quadrant tenderness, negative Lopez sign.    We will treat with some fluids, antinausea medication, Toradol for headache.  Check labs.  In review of records seems that she has had this issue ongoing quite frequently.  She has had negative CT scans in the past.  She has a relatively benign abdominal exam, has been seen multiple times for epigastric abdominal pain and has had full work-up including imaging without acute findings.  We will monitor her here, reevaluate after  nausea is improved to see if she has any improvement.  Her abdominal exam here is fairly benign, she does not have any concerning findings of right upper quadrant tenderness and no lower abdominal tenderness.  No rebound or guarding.  Will recheck after medications.   0607 Patient's labs back here unremarkable.  Her urinalysis here does show some ketones, there is leuk esterase.  Went back into reevaluate, nausea is improved but now she saying she has some mild suprapubic tenderness.  She did state that when she did urinate to give us a urine sample she had some mild burning.  She does have leuk esterase noted in her UA with some mucus present.  We will start her on some Macrobid.  Otherwise her labs here at her baseline.  COVID swabs negative.  She is still complaining of some mild headache so we will give her some Compazine and Benadryl and some fluids.  Likely discharge home with follow-up with primary care.  Will write antibiotics for urinary tract infection.     Patient feeling significantly improved after Compazine and Benadryl.  No longer having abdominal pain, headache is resolved.  Plan to discharge to home, follow-up with primary care.  Written prescriptions for antinausea medication and Macrobid.  Return for any new or worsening symptoms.    Medical Decision Making    History:    Supplemental history from: Documented in chart, if applicable    External Record(s) reviewed: Documented in chart, if applicable.    Work Up:    Chart documentation includes differential considered and any EKGs or imaging independently interpreted by provider, where specified.    In additional to work up documented, I considered the following work up: Documented in chart, if applicable.    External consultation:    Discussion of management with another provider: Documented in chart, if applicable    Complicating factors:    Care impacted by chronic illness: N/A    Care affected by social determinants of health: N/A    Disposition  considerations: Discharge. I prescribed additional prescription strength medication(s) as charted. See documentation for any additional details.        Critical care: 0 minutes excluding separately billable procedures.  Includes bedside management, time reviewing test results, review of records, discussing the case with staff, documenting the medical record and time spent with family members (or surrogate decision makers) discussing specific treatment issues.          ED COURSE:  4:10 AM I met with the patient, obtained history, performed an initial exam, and discussed options and plan for diagnostics and treatment here in the ED.  6:03 AM I rechecked and updated patient on results. Patient feels less nauseous and states that her headache has significantly improved compared to when she arrived. Will give more medications.     The importance of close follow up was discussed. We reviewed warning signs and symptoms, and I instructed Ms. Mcpherson to return to the emergency department immediately if she develops any new or worsening symptoms. I provided additional verbal discharge instructions. Ms. Mcpherson expressed understanding and agreement with this plan of care, her questions were answered, and she was discharged in stable condition.     MEDICATIONS GIVEN IN THE EMERGENCY:  Medications   0.9% sodium chloride BOLUS (has no administration in time range)   nitroFURantoin macrocrystal-monohydrate (MACROBID) capsule 100 mg (has no administration in time range)   0.9% sodium chloride BOLUS (1,000 mLs Intravenous $New Bag 6/19/23 8618)   ondansetron (ZOFRAN) injection 4 mg (4 mg Intravenous $Given 6/19/23 0451)   ketorolac (TORADOL) injection 30 mg (30 mg Intravenous $Given 6/19/23 0451)   pantoprazole (PROTONIX) IV push injection 40 mg (40 mg Intravenous $Given 6/19/23 0508)   dimenhyDRINATE (DRAMAMINE) injection 25 mg (25 mg Intravenous $Given 6/19/23 0451)   prochlorperazine (COMPAZINE) injection 10 mg (10 mg Intravenous  $Given 6/19/23 0612)   diphenhydrAMINE (BENADRYL) injection 25 mg (25 mg Intravenous $Given 6/19/23 0612)       NEW PRESCRIPTIONS STARTED AT TODAY'S ER VISIT:  New Prescriptions    NITROFURANTOIN MACROCRYSTAL-MONOHYDRATE (MACROBID) 100 MG CAPSULE    Take 1 capsule (100 mg) by mouth 2 times daily    ONDANSETRON (ZOFRAN ODT) 8 MG ODT TAB    Take 1 tablet (8 mg) by mouth every 8 hours as needed for nausea          =================================================================    HPI    Patient information was obtained from: Patient    Use of : N/A        Adelina SULTANA Mcpherson is a 27 year old female with a history seizures who presents to the ED via walk-in for the evaluation.    Patient reports some upper abdominal pain, nausea, vomiting, and diarrhea, that started four days ago. She notes associating headache and some muscle aches. Patient states that she tried to take medications for her headache but vomited this up. Otherwise, she denies any rhinorrhea, sore throat, and recent sick contacts. No other complaints at this time.    REVIEW OF SYSTEMS   Review of Systems   HENT: Negative for rhinorrhea and sore throat.    Gastrointestinal: Positive for abdominal pain, diarrhea, nausea and vomiting.   Musculoskeletal: Positive for myalgias.   Neurological: Positive for headaches.   All other systems reviewed and are negative.    PAST MEDICAL HISTORY:  Past Medical History:   Diagnosis Date     Seizures (H)        PAST SURGICAL HISTORY:  History reviewed. No pertinent surgical history.    CURRENT MEDICATIONS:      Current Facility-Administered Medications:      0.9% sodium chloride BOLUS, 1,000 mL, Intravenous, Once, Toshia Rivero MD     nitroFURantoin macrocrystal-monohydrate (MACROBID) capsule 100 mg, 100 mg, Oral, Once, Toshia Rivero MD    Current Outpatient Medications:      nitroFURantoin macrocrystal-monohydrate (MACROBID) 100 MG capsule, Take 1 capsule (100 mg) by mouth 2 times daily, Disp: 6 capsule,  "Rfl: 0     ondansetron (ZOFRAN ODT) 8 MG ODT tab, Take 1 tablet (8 mg) by mouth every 8 hours as needed for nausea, Disp: 12 tablet, Rfl: 0     ondansetron (ZOFRAN ODT) 4 MG ODT tab, Take 1 tablet (4 mg) by mouth every 8 hours as needed for nausea or vomiting, Disp: 10 tablet, Rfl: 0    ALLERGIES:  No Known Allergies    FAMILY HISTORY:  History reviewed. No pertinent family history.    SOCIAL HISTORY:   Social History     Socioeconomic History     Marital status: Single   Tobacco Use     Smoking status: Never     Smokeless tobacco: Never       PHYSICAL EXAM:    Vitals: /62   Pulse 66   Temp 97.7  F (36.5  C) (Temporal)   Resp 18   Ht 1.626 m (5' 4\")   Wt 62.1 kg (137 lb)   SpO2 100%   BMI 23.52 kg/m     General:. Alert and interactive, mildly uncomfortable appearing.  HENT: Oropharynx without erythema or exudates. MMM.  TMs clear bilaterally.  Eyes: Pupils mid-sized and equally reactive.   Neck: Full AROM.  No midline tenderness to palpation.  Cardiovascular: Regular rate and rhythm. Peripheral pulses 2+ bilaterally.  Chest/Pulmonary: Normal work of breathing. Lung sounds clear and equal throughout, no wheezes or crackles. No chest wall tenderness or deformities.  Abdomen: Soft, nondistended. Epigastric and mild lower abdominal tenderness to palpation. No significant RUQ tenderness. Negative Lopez's sign. No guarding or rebound.  Back/Spine: No CVA or midline tenderness.  Extremities: Normal ROM of all major joints. No lower extremity edema.   Skin: Warm and dry. Normal skin color.   Neuro: Speech clear. CNs grossly intact. Moves all extremities appropriately. Strength and sensation grossly intact to all extremities.   Psych: Normal affect/mood, cooperative, memory appropriate.     LAB:  All pertinent labs reviewed and interpreted.  Labs Ordered and Resulted from Time of ED Arrival to Time of ED Departure   COMPREHENSIVE METABOLIC PANEL - Abnormal       Result Value    Sodium 138      Potassium 3.6   "    Chloride 105      Carbon Dioxide (CO2) 20 (*)     Anion Gap 13      Urea Nitrogen 4 (*)     Creatinine 0.70      Calcium 9.4      Glucose 135 (*)     Alkaline Phosphatase 65      AST 26      ALT 17      Protein Total 8.4 (*)     Albumin 4.4      Bilirubin Total 0.3      GFR Estimate >90     ROUTINE UA WITH MICROSCOPIC REFLEX TO CULTURE - Abnormal    Color Urine Light Yellow      Appearance Urine Clear      Glucose Urine Negative      Bilirubin Urine Negative      Ketones Urine 40 (*)     Specific Gravity Urine 1.022      Blood Urine Negative      pH Urine 5.5      Protein Albumin Urine 10 (*)     Urobilinogen Urine <2.0      Nitrite Urine Negative      Leukocyte Esterase Urine 75 Viola/uL (*)     Mucus Urine Present (*)     RBC Urine 1      WBC Urine 6 (*)     Squamous Epithelials Urine 3 (*)    CBC WITH PLATELETS AND DIFFERENTIAL - Abnormal    WBC Count 5.0      RBC Count 5.49 (*)     Hemoglobin 10.1 (*)     Hematocrit 35.6      MCV 65 (*)     MCH 18.4 (*)     MCHC 28.4 (*)     RDW 20.9 (*)     Platelet Count 298      % Neutrophils 44      % Lymphocytes 44      % Monocytes 8      % Eosinophils 3      % Basophils 1      % Immature Granulocytes 0      NRBCs per 100 WBC 0      Absolute Neutrophils 2.3      Absolute Lymphocytes 2.2      Absolute Monocytes 0.4      Absolute Eosinophils 0.1      Absolute Basophils 0.0      Absolute Immature Granulocytes 0.0      Absolute NRBCs 0.0     INFLUENZA A/B, RSV, & SARS-COV2 PCR - Normal    Influenza A PCR Negative      Influenza B PCR Negative      RSV PCR Negative      SARS CoV2 PCR Negative     LIPASE - Normal    Lipase 20     HCG QUALITATIVE URINE - Normal    hCG Urine Qualitative Negative         RADIOLOGY:  No orders to display         I, Katharine Patel, am serving as a scribe to document services personally performed by Dr. Toshia Rivero  based on my observation and the provider's statements to me. I, Toshia Rivero MD attest that Katharine Patel is acting in a scribe capacity,  has observed my performance of the services and has documented them in accordance with my direction.      Toshia Rivero M.D.  Emergency Medicine  [facility] EMERGENCY ROOM  [address] [facility] [address]  [phone]  Dept: [phone]     Toshia Rivero MD  06/19/23 0637